# Patient Record
Sex: MALE | Race: WHITE | NOT HISPANIC OR LATINO
[De-identification: names, ages, dates, MRNs, and addresses within clinical notes are randomized per-mention and may not be internally consistent; named-entity substitution may affect disease eponyms.]

---

## 2018-12-14 PROBLEM — Z00.00 ENCOUNTER FOR PREVENTIVE HEALTH EXAMINATION: Status: ACTIVE | Noted: 2018-12-14

## 2018-12-19 ENCOUNTER — RECORD ABSTRACTING (OUTPATIENT)
Age: 58
End: 2018-12-19

## 2018-12-19 DIAGNOSIS — Z87.19 PERSONAL HISTORY OF OTHER DISEASES OF THE DIGESTIVE SYSTEM: ICD-10-CM

## 2018-12-19 DIAGNOSIS — Z86.010 PERSONAL HISTORY OF COLONIC POLYPS: ICD-10-CM

## 2018-12-19 DIAGNOSIS — Z78.9 OTHER SPECIFIED HEALTH STATUS: ICD-10-CM

## 2018-12-21 ENCOUNTER — APPOINTMENT (OUTPATIENT)
Dept: GASTROENTEROLOGY | Facility: HOSPITAL | Age: 58
End: 2018-12-21

## 2020-12-23 ENCOUNTER — APPOINTMENT (OUTPATIENT)
Dept: GASTROENTEROLOGY | Facility: HOSPITAL | Age: 60
End: 2020-12-23

## 2021-05-28 ENCOUNTER — APPOINTMENT (OUTPATIENT)
Dept: GASTROENTEROLOGY | Facility: CLINIC | Age: 61
End: 2021-05-28
Payer: COMMERCIAL

## 2021-05-28 DIAGNOSIS — Z86.010 PERSONAL HISTORY OF COLONIC POLYPS: ICD-10-CM

## 2021-05-28 DIAGNOSIS — A63.0 ANOGENITAL (VENEREAL) WARTS: ICD-10-CM

## 2021-05-28 DIAGNOSIS — Z80.0 FAMILY HISTORY OF MALIGNANT NEOPLASM OF DIGESTIVE ORGANS: ICD-10-CM

## 2021-05-28 PROCEDURE — 99442: CPT

## 2021-05-28 NOTE — HISTORY OF PRESENT ILLNESS
[FreeTextEntry1] : telephonic visit\par consent on file\par audio only\par patient at home\par i am in my office, in [place], [place]\par \par patient is having the call to discuss timing of fu colonoscopy\par \par He has several colonic polyps by history including \par october 2012, a large 2.5 cm multilobulated polyp removed from the cecum, sessile\par \par May 2015, flat plaque like polyp 1.5 cm abvove the anal canal, which was snared and proved to be a condylomata, and patient has been managed by dr Beth Romero, a colo rectal surgeon, since then\par \par last colonoscopy negative dec 2018\par \par since that time\par mother, over eighty diagnosed with one and perhaps two colonic malignancies or cancers, on high up, and one in the rectum\par \par and the patient's sister has had one large polyp removed which was so large that she was kept in the hospital afterwards for observation

## 2021-05-28 NOTE — ASSESSMENT
[FreeTextEntry1] : patient with history of large adenomatous polyp, and also, history of condylomata of anal canal, and is now referred for discussion of the next colonoscopy\par \par 2.  with his strong family history in addition to the patient's largepolyp at a relatively early ageI blanca recommending that the next colonoscopy be done in the next month or so rather than waiting a full 3years.It is certainly possible that the patient has a familial syndrome\par Please notethat in addition to the large lesion of 2012 he had a tubular adenoma removed from the rectum at 9 cminJanuary 2017\par 3 He will also try to find outif any genetic testing has been done either on his sisteror on his mother\par \par at some point, but not just yet [no specific diagnosis of the familial syndrome, and no upper gi symptoms, we should consider an egd\par \par now, we will just be setting up a colonoscopy\par \par More than 50% of the face to face time was devoted to counseling and /or coordination of care.  THis coordination of care may have included reviewing other medical notes and reports, and communicating with other health professionals\par

## 2021-06-13 ENCOUNTER — RESULT REVIEW (OUTPATIENT)
Age: 61
End: 2021-06-13

## 2021-06-14 ENCOUNTER — APPOINTMENT (OUTPATIENT)
Dept: GASTROENTEROLOGY | Facility: HOSPITAL | Age: 61
End: 2021-06-14

## 2022-02-25 ENCOUNTER — TRANSCRIPTION ENCOUNTER (OUTPATIENT)
Age: 62
End: 2022-02-25

## 2022-06-08 ENCOUNTER — APPOINTMENT (OUTPATIENT)
Dept: GASTROENTEROLOGY | Facility: CLINIC | Age: 62
End: 2022-06-08
Payer: COMMERCIAL

## 2022-06-08 VITALS
TEMPERATURE: 97.4 F | WEIGHT: 189 LBS | HEART RATE: 72 BPM | HEIGHT: 76 IN | BODY MASS INDEX: 23.02 KG/M2 | SYSTOLIC BLOOD PRESSURE: 152 MMHG | DIASTOLIC BLOOD PRESSURE: 70 MMHG

## 2022-06-08 DIAGNOSIS — R19.8 OTHER SPECIFIED SYMPTOMS AND SIGNS INVOLVING THE DIGESTIVE SYSTEM AND ABDOMEN: ICD-10-CM

## 2022-06-08 DIAGNOSIS — R14.0 ABDOMINAL DISTENSION (GASEOUS): ICD-10-CM

## 2022-06-08 DIAGNOSIS — Z80.0 FAMILY HISTORY OF MALIGNANT NEOPLASM OF DIGESTIVE ORGANS: ICD-10-CM

## 2022-06-08 PROCEDURE — 99214 OFFICE O/P EST MOD 30 MIN: CPT | Mod: 25

## 2022-06-08 PROCEDURE — 36415 COLL VENOUS BLD VENIPUNCTURE: CPT

## 2022-06-08 NOTE — ASSESSMENT
[FreeTextEntry1] : 1.  Abdominal and chest discomfort, somewhat nonspecific, chest discomfort is better and the abdominal discomfort has been somewhat persistent\par 2.  Has not had any specific work-up, but saw his personal physician  in [place] recently and had some "routine labs "; was not told that there were any abnormalities.\par 3.  The abdominal pain was never intense\par 4.  Some irregularity of his bowel movements now with small volume stools at times and he could have IBS.\par \par Plan\par 1 he should take a probiotic such as Culturelle\par 2.  He should consider using Lactaid enzymes just on an empiric basis\par 3.  I am actually doing some blood work for celiac disease, and 1 wonders if this could be some degree of gluten intolerance\par 4.  We may have to try changing some of his dietary fiber as well\par 5.  Doing lab work sed rate CRP celiac testing and CA 19–9\par 6 abdominal ultrasound\par \par AS WE OBTAIN INFORMED CONSENT FOR COLONOSCOPY, UPPER ENDOSCOPY [EGD], OR BOTH PROCEDURES TOGETHER;\par \par As with all procedures, there are risks of which the patient should be aware\par \par 1.  Anesthesia; deep sedation with Propofol;  there is a small risk of aspiration and pulmonary infection.  The Anesthesiologist meets with the patient the morning of the procedure to discuss in more detail\par \par 2.  risk of bleeding; from removal of a polyp, or rarely, from biopsies, 1 % or less\par \par 3.  risk of injury or perforation of the colon or upper GI tract; one in a thousand or less,  from removing a polyp or from advancing the instrument\par \par \par

## 2022-06-08 NOTE — HISTORY OF PRESENT ILLNESS
[FreeTextEntry1] : Follow-up visit but for a new problem.  In late 2021 Warren noted the onset of gaseous bloating of the abdomen, some upper abdominal bloating particularly, at times discomfort which seem to radiate into the chest to the point that he actually went to a urgent care center and ultimately was sent to the emergency department of the hospital because of the chest discomfort.  That evaluation was negative.  The chest discomfort completely resolved but abdominal discomfort after improving by 80 to 90% just recently started to get somewhat more pronounced again\par \par Initially when this began he was [facility] and [place] and he was in a spring which he later learned had been the source of some type of infectious etiology or illness for some people who had been in\par \par At the height of the symptoms there was perhaps some minor relief with a good evacuation.\par What he did notice however was definite irregularity of his bowel movements, sometimes with multiple small stools, sometimes like adrian, but somewhat inconsistent and irregular, but not generally loose and with no blood\par \par 1 year ago he had a colonoscopy that was negative.\par \par No heartburn, no exertional chest pain,\par \par As mentioned, he no longer has any chest discomfort, he went for some weeks with virtually no abdominal discomfort, and now he is had some increase in the abdominal discomfort which seems to come and go.  It is not however intense\par \par Know he had a mild COVID infection with symptoms lasting only several days in January, but although I considered a post COVID phenomenon, his symptoms were actually preceding the COVID infection

## 2024-09-14 ENCOUNTER — OFFICE VISIT (OUTPATIENT)
Dept: URGENT CARE | Facility: CLINIC | Age: 64
End: 2024-09-14
Payer: COMMERCIAL

## 2024-09-14 VITALS
TEMPERATURE: 98.1 F | OXYGEN SATURATION: 100 % | WEIGHT: 181.8 LBS | DIASTOLIC BLOOD PRESSURE: 60 MMHG | SYSTOLIC BLOOD PRESSURE: 118 MMHG | RESPIRATION RATE: 12 BRPM | HEART RATE: 70 BPM

## 2024-09-14 DIAGNOSIS — R55 SYNCOPE, UNSPECIFIED SYNCOPE TYPE: ICD-10-CM

## 2024-09-14 DIAGNOSIS — R55 VASOVAGAL SYNCOPE: Primary | ICD-10-CM

## 2024-09-14 LAB — GLUCOSE SERPL-MCNC: 148 MG/DL (ref 65–140)

## 2024-09-14 PROCEDURE — 99213 OFFICE O/P EST LOW 20 MIN: CPT | Performed by: PHYSICIAN ASSISTANT

## 2024-09-14 RX ORDER — ATORVASTATIN CALCIUM 20 MG/1
20 TABLET, FILM COATED ORAL DAILY
COMMUNITY
Start: 2024-08-14

## 2024-09-14 NOTE — PROGRESS NOTES
"  [facility]        NAME: Jonathan Dietz is a 64 y.o. male  : 1960    MRN: [id number]  DATE: 2024  TIME: 10:32 AM    Assessment and Plan   Vasovagal syncope [R55]  1. Vasovagal syncope          Patient Instructions   Vasovagal syncope with possible mild concussion from fall  Tylenol/ibuprofen as needed for headache  Limit eyestrain by limiting screen time and wearing sunglasses outside  Follow-up with GI for dysphagia  Follow-up with PCP when home    Follow up with PCP in 3-5 days.  Proceed to  ER if symptoms worsen.    If tests have been performed at [facility], our office will contact you with results if changes need to be made to the care plan discussed with you at the visit.  [facility]    Chief Complaint     Chief Complaint   Patient presents with    Loss of Consciousness     Reports he passed out last night after having difficulty swallowing milk. States he felt as though only seconds passed before he regained consciousness and his friend found him. Unsure if he hit his head. States he has a mild headache, nausea, feels \"bloated\" and has since had intermittent episodes of dizziness. Reports he just started taking Atorvastatin 3 weeks ago.         History of Present Illness       Jonathan is a 64-year-old male who presents to clinic complaining of headache and abdominal discomfort.  He states that yesterday he was drinking milk when he had difficulty swallowing go up of milk felt it was very difficult to swallow until he leaned over and then felt lightheaded and passed out.  He thinks he hit his head but states the loss of consciousness was less than a minute.  He notes slight diffuse headache but denies any blurry vision, nausea, vomiting, or altered mental status.  His abdominal pain is located in the umbilical area and not pain but discomfort no sharp pain and notes increased belching.        Review of Systems   Review of Systems "   Constitutional:  Negative for chills and fever.   Eyes:  Negative for visual disturbance.   Respiratory:  Negative for chest tightness and shortness of breath.    Cardiovascular:  Negative for chest pain.   Gastrointestinal:  Negative for nausea and vomiting.   Neurological:  Positive for headaches. Negative for dizziness and light-headedness.         Current Medications       Current Outpatient Medications:     atorvastatin (LIPITOR) 20 mg tablet, Take 20 mg by mouth daily, Disp: , Rfl:     Current Allergies     Allergies as of 09/14/2024 - Reviewed 09/14/2024   Allergen Reaction Noted    Penicillin g Itching 01/09/2023            The following portions of the patient's history were reviewed and updated as appropriate: allergies, current medications, past family history, past medical history, past social history, past surgical history and problem list.     History reviewed. No pertinent past medical history.    Past Surgical History:   Procedure Laterality Date    FLEXOR TENDON REPAIR Left     PECTORALIS MAJOR REPAIR         No family history on file.      Medications have been verified.        Objective   /60 Comment: manual  Pulse 70   Temp 98.1 °F (36.7 °C) (Tympanic)   Resp 12   Wt 82.5 kg (181 lb 12.8 oz)   SpO2 100%   No LMP for male patient.       Physical Exam     Physical Exam  Vitals and nursing note reviewed.   Constitutional:       General: He is not in acute distress.     Appearance: Normal appearance. He is not ill-appearing.   Cardiovascular:      Rate and Rhythm: Normal rate and regular rhythm.      Heart sounds: Normal heart sounds.   Pulmonary:      Effort: Pulmonary effort is normal.      Breath sounds: Normal breath sounds.   Abdominal:      General: Bowel sounds are normal. There is no distension.      Palpations: Abdomen is soft. There is no pulsatile mass.      Tenderness: There is no abdominal tenderness. There is no guarding or rebound. Negative signs include Li's sign,  Rovsing's sign and McBurney's sign.   Neurological:      Mental Status: He is alert and oriented to person, place, and time.   Psychiatric:         Mood and Affect: Mood normal.         Behavior: Behavior normal.